# Patient Record
Sex: MALE | Race: WHITE | URBAN - METROPOLITAN AREA
[De-identification: names, ages, dates, MRNs, and addresses within clinical notes are randomized per-mention and may not be internally consistent; named-entity substitution may affect disease eponyms.]

---

## 2018-05-23 ENCOUNTER — TELEPHONE (OUTPATIENT)
Dept: DERMATOLOGY | Facility: AMBULATORY SURGERY CENTER | Age: 73
End: 2018-05-23

## 2018-05-23 NOTE — TELEPHONE ENCOUNTER
Mohs Pre-Op Assessment    Patient Appointment Date: 6/4 @444    Keanu Sigala, 81 y.o., male      does confirm site: R helix  Brief description of tumor: SCC  does ID site. (Can they still visibly see the site)  does not have Hepatitis C   does not have HIV (If YES, set up consult appointment)    Allergies: Allergies not on file    does not have an Electrical Implanted Device (Pacemaker, AICD, brain stimulator, etc.)    does not need antibiotics    is not taking NSAIDs    is taking aspirin    is not taking Garlic  is not taking Ginkgo  is not taking Ginseng  is not taking Fish oils  is not taking Vit E    does not take a blood thinner(i.e. Coumadin/Warfarin, Plavix, Brilinta, Pradaxa, Xarelto, Effient, Eliquis, Savaysa)  If taking Coumadin needs to have PT/INR drawn and faxed results within a week of surgery    Pre operative assessment questions asked to patient. Patient has a general understanding of the procedure, and has been versed that there will be local anesthesia used in the procedure and that He will be ok to drive themselves to and from the appointment. Patient has been notified to arrive 15-20 minutes early and they may eat or drink before arriving.

## 2018-06-04 ENCOUNTER — OFFICE VISIT (OUTPATIENT)
Dept: DERMATOLOGY | Facility: AMBULATORY SURGERY CENTER | Age: 73
End: 2018-06-04

## 2018-06-04 VITALS
BODY MASS INDEX: 23.54 KG/M2 | SYSTOLIC BLOOD PRESSURE: 150 MMHG | WEIGHT: 150 LBS | OXYGEN SATURATION: 98 % | RESPIRATION RATE: 18 BRPM | HEART RATE: 85 BPM | DIASTOLIC BLOOD PRESSURE: 90 MMHG | HEIGHT: 67 IN

## 2018-06-04 DIAGNOSIS — D04.21 SQUAMOUS CELL CARCINOMA IN SITU OF SKIN OF HELIX OF RIGHT EAR: Primary | ICD-10-CM

## 2018-06-04 RX ORDER — ASPIRIN 81 MG/1
TABLET ORAL DAILY
COMMUNITY

## 2018-06-04 RX ORDER — DEXLANSOPRAZOLE 60 MG/1
CAPSULE, DELAYED RELEASE ORAL
Refills: 1 | COMMUNITY
Start: 2018-05-18

## 2018-06-04 RX ORDER — ATORVASTATIN CALCIUM 20 MG/1
TABLET, FILM COATED ORAL
Refills: 0 | COMMUNITY
Start: 2018-03-27

## 2018-06-04 RX ORDER — LISINOPRIL 20 MG/1
TABLET ORAL
Refills: 1 | COMMUNITY
Start: 2018-05-18

## 2018-06-04 NOTE — PROGRESS NOTES
This note is written by Leoncio Patton, as dictated by Maria Strauss. Cindy Cat MD.    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 male referred by Dr. Brayden Garcia. 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 This is a new squamous cell carcinoma in situ present for months described as a lesion that concerned Dr. Brayden Garcia with rough surface and tenderness with no prior treatment. Biopsy confirmed the diagnosis of squamous cell carcinoma in situ, and I reviewed the written pathology report. He is feeling well and in his usual state of health today. He has no pain, no current illnesses, no other skin concerns. His allergies, medications, medical, and social history are reviewed by me today. He sees Dr. Prieto Watson for routine skin exams. Exam:     He is an awake, alert, and oriented 81 y.o. male who appears well and in no distress. There is no preauricular, submandibular, or cervical lymphadenopathy. I examined his right ear. He has a 6 x 5 mm white scar with keratotic rim on his right superior helical rim. He confirms location. Assessment/plan:    1. Squamous cell carcinoma in situ, right superior helical rim. I discussed the diagnosis of squamous cell carcinoma in situ and summarized the pathology report. Mohs surgery is indicated by site and size. The procedure was discussed, verbal and written consent were obtained. I performed the procedure. One stage was required to reach a tumor free plane. The surgical defect was managed with intermediate repair. There were no complications. He will follow up as needed as the site heals. Indications, risks, and options were discussed with Sunny Mario preoperatively. Risks including, but not limited to: pain, bleeding, infection, tumor recurrence, scarring and damage to motor and/or sensory nerves, were discussed.  Sunny Mario chose Mohs surgery. Pipe Salcido was an acceptable surgery candidate. Pipe Salcido was placed in the appropriate position on the operating table in the Mohs surgery procedure room. The area was prepped and draped in the standard manner. Gentian violet was used to outline the clinical margins of the tumor. Local anesthesia was then obtained. The grossly visible tumor was then removed, an underlying layer was excised and mapped according to the Mohs technique, and the individual specimens examined microscopically. The process was repeated until microscopic examination of the tissue specimens confirmed a tumor-free plane. Hemostasis was obtained with electrosurgery and pressure. The wound was covered between stages with moist saline gauze. The wound management options of second intent healing, layered closure, local flap, and/or full thickness skin graft were discussed. Pipe Salcido understands the aims, risks, alternatives, and possible complications and elects to proceed with an intermediate layered closure. Wound margins were made vertical, edges undermined in the perichondrial plane, standing cones corrected at both poles followed by layered closure. The wound was closed with buried 6-0 polysorb suture in the subcutis to reduce tension on the skin edges, and skin edges were approximated with 6-0 polysorb suture in the dermis to reduce tension on the epidermis. The final closure length was 40 mm. The wound was bandaged with skin glue, Telfa, gauze and Coverroll. Wound care instructions (written and verbal) and a follow up appointment were given to Pipe Salcido before discharge. Pipe Salcido was discharged in good condition. 2. History of nonmelanoma skin cancer. I discussed the diagnosis and recommend routine examinations with Dr. Karie Miller for surveillance.     The documentation recorded by the scribe accurately reflects the service I personally performed and the decisions made by me.     [facility]   OFFICE PROCEDURE PROGRESS NOTE     Chart reviewed for the following:     Nilda Ghotra MD, have reviewed the History, Physical and updated the Allergic reactions for [address] performed immediately prior to start of procedure:     Nilda Ghotra MD, have performed the following reviews on Cruzito Camarillo prior to the start of the procedure:     * Patient was identified by name and date of birth   * Agreement on procedure being performed was verified   * Risks and Benefits explained to the patient   * Procedure site verified and marked as necessary   * Patient was positioned for comfort   * Consent was signed and verified     Time: 8:40 AM   Date of procedure: 6/4/2018  Procedure performed by: Marixa Thorpe.  Melissa Ghotra MD   Provider assisted by: RN   Patient assisted by: self   How tolerated by patient: tolerated the procedure well with no complications   Comments: none

## 2018-06-04 NOTE — MR AVS SNAPSHOT
[facility] [address] 
[phone] Patient: Linda Johnson MRN: [id number] :1945 Visit Information Date & Time Provider Department Dept. Phone Encounter #  
 2018  8:30 AM Horace Bustillo MD [facility] [address] [phone] [id number] Upcoming Health Maintenance Date Due Hepatitis C Screening 1945 DTaP/Tdap/Td series (1 - Tdap) 1968 FOBT Q 1 YEAR AGE 50-75 1997 ZOSTER VACCINE AGE 60> 2007 GLAUCOMA SCREENING Q2Y 2012 Pneumococcal 65+ Low/Medium Risk (1 of 2 - PCV13) 2012 Influenza Age 5 to Adult 2018 Allergies as of 2018  Review Complete On: 2018 By: Horace Bustillo MD  
  
 Severity Noted Reaction Type Reactions Penicillins  2018    Rash  
 1985 Current Immunizations  Never Reviewed No immunizations on file. Not reviewed this visit You Were Diagnosed With   
  
 Codes Comments Squamous cell carcinoma in situ of skin of helix of right ear    -  Primary ICD-10-CM: D04.21 
ICD-9-CM: 232.2 Vitals BP Pulse Resp Height(growth percentile) Weight(growth percentile) SpO2  
 150/90 (BP 1 Location: Left arm, BP Patient Position: Sitting) 85 18 5' 7\" (1.702 m) 150 lb (68 kg) 98% BMI Smoking Status 23.49 kg/m2 Former Smoker BMI and BSA Data Body Mass Index Body Surface Area  
 23.49 kg/m 2 1.79 m 2 Your Updated Medication List  
  
   
This list is accurate as of 18  9:11 AM.  Always use your most recent med list.  
  
  
  
  
 aspirin delayed-release 81 mg tablet Take  by mouth daily. atorvastatin 20 mg tablet Commonly known as:  LIPITOR  
TAKE 1 TABLET BY MOUTH EVERY DAY DEXILANT 60 mg Cpdb Generic drug:  Dexlansoprazole TAKE 1 TABLET BY MOUTH EVERY DAY  
  
 lisinopril 20 mg tablet Commonly known as:  Becki Muro  
 TAKE 1 TABLET BY MOUTH EVERY DAY Patient Instructions WOUND CARE INSTRUCTIONS 1. Keep the dressing clean and dry and do not remove for 48 hours. 2. Then change the dressing once a day as follows: 
a. Wash hands before and after each dressing change. b. Remove dressing and wash site gently with mild soap and water, rinse, and pat dry. 
c. Apply an ointment (Bacitracin, Polysporin, Neosporin, Petroleum jelly or Aquaphor). d. Apply a non-stick (Telfa) dressing or Band-Aid to cover the wound. Remove pressure bandage on Wednesday. You may shower daily at this point, no bandage necessary. Glue will eventually come off within the next 2 weeks. If you still feel rough glue at this point, you may apply vaseline to site daily until fully removed. 3. Watch for: BLEEDING: A small amount of drainage may occur. If bleeding occurs, elevate and rest the surgery site. Apply gauze and steady pressure for 15 minutes. If bleeding continues, call this office. INFECTION: Signs of infection include increased redness, pain, warmth, drainage of pus, and fever. If this occurs, call this office. 4. Special Instructions (follow any that are checked): 
· [x] You have stitches that DO NOT need to be removed. · [x] Avoid bending at the waist and heavy lifting for two days. · [x] Sleep with your head elevated for the next two nights. · [x] Rest the surgery site and keep it elevated as much as possible for two days. · [x] You may apply an ice-pack for 10-15 minutes every waking hour for the rest of the day. · [] Eat a soft diet and avoid hot food and hot drinks for the rest of the day. · [] Other instructions: Follow up as directed. Take Tylenol or Ibuprofen for pain as needed. Once the site is healed with no remaining bandages or open areas, protect your surgical site and scar from the sun, as this area will be more sensitive.   Use a broad spectrum sunscreen SPF 30 or higher daily, and a chemical free product (one containing zinc oxide or titanium dioxide) is a good choice if the area is sensitive. You may begin to gently massage the surgical site in 2-3 weeks, rubbing in a circular motion along the scar. This can help reduce swelling and thickness of a scar. A scar cream may be used beginnning 1 month after the surgery. If you have any questions or concerns, please call our office Monday through Friday at [phone]. Introducing hospitals & HEALTH SERVICES! [address] introduces [employer] patient portal. Now you can access parts of your medical record, email your doctor's office, and request medication refills online. 1. In your internet browser, go to [email or web address] 2. Click on the First Time User? Click Here link in the Sign In box. You will see the New Member Sign Up page. 3. Enter your [employer] Access Code exactly as it appears below. You will not need to use this code after youve completed the sign-up process. If you do not sign up before the expiration date, you must request a new code. · [employer] Access Code: [email or web address] Expires: 8/27/2018  3:44 PM 
 
4. Enter the last four digits of your Social Security Number (xxxx) and Date of Birth (mm/dd/yyyy) as indicated and click Submit. You will be taken to the next sign-up page. 5. Create a [employer] ID. This will be your [employer] login ID and cannot be changed, so think of one that is secure and easy to remember. 6. Create a [employer] password. You can change your password at any time. 7. Enter your Password Reset Question and Answer. This can be used at a later time if you forget your password. 8. Enter your e-mail address. You will receive e-mail notification when new information is available in [address] 9. Click Sign Up. You can now view and download portions of your medical record. 10. Click the Download Summary menu link to download a portable copy of your medical information. If you have questions, please visit the Frequently Asked Questions section of the [employer] website. Remember, [employer] is NOT to be used for urgent needs. For medical emergencies, dial 911. Now available from your iPhone and Android! Please provide this summary of care documentation to your next provider. If you have any questions after today's visit, please call [phone].

## 2018-06-04 NOTE — PATIENT INSTRUCTIONS
WOUND CARE INSTRUCTIONS    1. Keep the dressing clean and dry and do not remove for 48 hours. 2. Then change the dressing once a day as follows:  a. Wash hands before and after each dressing change. b. Remove dressing and wash site gently with mild soap and water, rinse, and pat dry.  c. Apply an ointment (Bacitracin, Polysporin, Neosporin, Petroleum jelly or Aquaphor). d. Apply a non-stick (Telfa) dressing or Band-Aid to cover the wound. Remove pressure bandage on Wednesday. You may shower daily at this point, no bandage necessary. Glue will eventually come off within the next 2 weeks. If you still feel rough glue at this point, you may apply vaseline to site daily until fully removed. 3. Watch for:  BLEEDING: A small amount of drainage may occur. If bleeding occurs, elevate and rest the surgery site. Apply gauze and steady pressure for 15 minutes. If bleeding continues, call this office. INFECTION: Signs of infection include increased redness, pain, warmth, drainage of pus, and fever. If this occurs, call this office. 4. Special Instructions (follow any that are checked):  · [x] You have stitches that DO NOT need to be removed. · [x] Avoid bending at the waist and heavy lifting for two days. · [x] Sleep with your head elevated for the next two nights. · [x] Rest the surgery site and keep it elevated as much as possible for two days. · [x] You may apply an ice-pack for 10-15 minutes every waking hour for the rest of the day. · [] Eat a soft diet and avoid hot food and hot drinks for the rest of the day. · [] Other instructions: Follow up as directed. Take Tylenol or Ibuprofen for pain as needed. Once the site is healed with no remaining bandages or open areas, protect your surgical site and scar from the sun, as this area will be more sensitive.   Use a broad spectrum sunscreen SPF 30 or higher daily, and a chemical free product (one containing zinc oxide or titanium dioxide) is a good choice if the area is sensitive. You may begin to gently massage the surgical site in 2-3 weeks, rubbing in a circular motion along the scar. This can help reduce swelling and thickness of a scar. A scar cream may be used beginnning 1 month after the surgery. If you have any questions or concerns, please call our office Monday through Friday at [phone].

## 2025-05-01 ENCOUNTER — TRANSCRIBE ORDERS (OUTPATIENT)
Facility: HOSPITAL | Age: 80
End: 2025-05-01

## 2025-05-01 DIAGNOSIS — C61 MALIGNANT NEOPLASM OF PROSTATE (HCC): Primary | ICD-10-CM

## 2025-05-13 ENCOUNTER — HOSPITAL ENCOUNTER (OUTPATIENT)
Facility: HOSPITAL | Age: 80
Discharge: HOME | End: 2025-05-16
Attending: UROLOGY
Payer: COMMERCIAL

## 2025-05-13 DIAGNOSIS — C61 MALIGNANT NEOPLASM OF PROSTATE (HCC): ICD-10-CM

## 2025-05-13 PROCEDURE — 72197 MRI PELVIS W/O & W/DYE: CPT

## 2025-05-13 PROCEDURE — A9579 GAD-BASE MR CONTRAST NOS,1ML: HCPCS | Performed by: UROLOGY

## 2025-05-13 PROCEDURE — 6360000004 HC RX CONTRAST MEDICATION: Performed by: UROLOGY

## 2025-05-13 RX ADMIN — GADOTERIDOL 12 ML: 279.3 INJECTION, SOLUTION INTRAVENOUS at 19:30

## 2025-05-23 ENCOUNTER — TRANSCRIBE ORDERS (OUTPATIENT)
Facility: HOSPITAL | Age: 80
End: 2025-05-23

## 2025-05-23 DIAGNOSIS — R59.0 POSTAURICULAR LYMPHADENOPATHY: Primary | ICD-10-CM
